# Patient Record
(demographics unavailable — no encounter records)

---

## 2025-06-10 NOTE — COUNSELING
[Nutrition/ Exercise/ Weight Management] : nutrition, exercise, weight management [Body Image] : body image [Vitamins/Supplements] : vitamins/supplements [Sunscreen] : sunscreen [Bladder Hygiene] : bladder hygiene [Breast Self Exam] : breast self exam

## 2025-06-10 NOTE — COUNSELING
[Body Image] : body image [Nutrition/ Exercise/ Weight Management] : nutrition, exercise, weight management [Vitamins/Supplements] : vitamins/supplements [Sunscreen] : sunscreen [Bladder Hygiene] : bladder hygiene [Breast Self Exam] : breast self exam

## 2025-06-12 NOTE — DISCUSSION/SUMMARY
[FreeTextEntry1] :  GYN Annual evaluation today -pap smear and vaginal cultures sent We discussed -- Acute vaginitis, AUB and Pelvic pain causes and treatment options. PLAN: Start: Terconazole 80 MG Vaginal Suppository; APPLY 1 APPLICATORFUL VAGINALLY EVERY NIGHT AT BEDTIME FOR 3 DAYS for Vaginitis issue, Ordered MRI Pelvis w/wo IV Contrast for AUB and Pelvic pain. Patient verbalized understanding of all explanations, and her questions were answered, and all concerns were addressed. Patient was screened for depression - no signs of clinical depression. PHQ-2 on file Rx given for pelvic sonogram RTO in    I, William robles acting as scribe for Dr. Mallory. 06/10/2025   The documentation recorded by the scribe, in my presence, accurately reflects the service I personally performed, and the decisions made by me with my edits as appropriate on 06/12/2025  Teetee Mallory MD, FACOG

## 2025-06-12 NOTE — HISTORY OF PRESENT ILLNESS
[Patient reported PAP Smear was normal] : Patient reported PAP Smear was normal [Y] : Patient is sexually active [N] : Patient denies prior pregnancies [Localized] : localized [[place]] : intercourse [No] : Patient does not have concerns regarding sex [Currently Active] : currently active [Men] : men [TextBox_4] : 26 yo female presents for annual exam. Chief c/o: right side pelvic pain with sex, Vaginitis, AUB. [PapSmeardate] : 3/28/23 [LMPDate] : 5/26/25 [Menses] : not menses [Urination] : not urination [Bowel Movement] : not bowel movement [TextBox_7] : right side  [FreeTextEntry1] : postcoital right sided pelvic pain

## 2025-06-12 NOTE — REASON FOR VISIT
[Annual] : an annual visit. [Pelvic Pain] : pelvic pain [Abnormal Uterine Bleeding] : abnormal uterine bleeding [Vulvar/Vaginal Complaint] : vulvar/vaginal complaint

## 2025-06-12 NOTE — HISTORY OF PRESENT ILLNESS
[Patient reported PAP Smear was normal] : Patient reported PAP Smear was normal [Y] : Patient is sexually active [N] : Patient denies prior pregnancies [Localized] : localized [[place]] : intercourse [No] : Patient does not have concerns regarding sex [Men] : men [Currently Active] : currently active [TextBox_4] : 26 yo female presents for annual exam. Chief c/o: right side pelvic pain with sex, Vaginitis, AUB. [PapSmeardate] : 3/28/23 [LMPDate] : 5/26/25 [Menses] : not menses [Urination] : not urination [Bowel Movement] : not bowel movement [TextBox_7] : right side  [FreeTextEntry1] : postcoital right sided pelvic pain

## 2025-06-12 NOTE — PHYSICAL EXAM
[Chaperoned Physical Exam] : A chaperone was present in the examining room during all aspects of the physical examination. [MA] : MA [Appropriately responsive] : appropriately responsive [Alert] : alert [No Acute Distress] : no acute distress [No Lymphadenopathy] : no lymphadenopathy [Soft] : soft [Non-tender] : non-tender [Non-distended] : non-distended [Oriented x3] : oriented x3 [Examination Of The Breasts] : a normal appearance [No Discharge] : no discharge [No Masses] : no breast masses were palpable [Labia Minora] : normal [Labia Majora] : normal [Normal] : normal [Uterine Adnexae] : normal  [Adnexa Tenderness On The Right] : tender  [FreeTextEntry2] : William [FreeTextEntry6] : No tenderness, No nipple discharge and no adenopathy.

## 2025-06-12 NOTE — HISTORY OF PRESENT ILLNESS
[Patient reported PAP Smear was normal] : Patient reported PAP Smear was normal [Y] : Patient is sexually active [N] : Patient denies prior pregnancies [Localized] : localized [[place]] : intercourse [No] : Patient does not have concerns regarding sex [Currently Active] : currently active [Men] : men [TextBox_4] : 26 yo female presents for annual exam. Chief c/o: right side pelvic pain with sex, Vaginitis, AUB. [PapSmeardate] : 3/28/23 [LMPDate] : 5/26/25 [Menses] : not menses [Urination] : not urination [Bowel Movement] : not bowel movement [TextBox_7] : right side  [FreeTextEntry1] : postcoital right sided pelvic pain

## 2025-06-12 NOTE — PHYSICAL EXAM
[Chaperoned Physical Exam] : A chaperone was present in the examining room during all aspects of the physical examination. [MA] : MA [Appropriately responsive] : appropriately responsive [Alert] : alert [No Acute Distress] : no acute distress [No Lymphadenopathy] : no lymphadenopathy [Soft] : soft [Non-tender] : non-tender [Non-distended] : non-distended [Oriented x3] : oriented x3 [Examination Of The Breasts] : a normal appearance [No Masses] : no breast masses were palpable [No Discharge] : no discharge [Labia Majora] : normal [Labia Minora] : normal [Normal] : normal [Uterine Adnexae] : normal  [Adnexa Tenderness On The Right] : tender  [FreeTextEntry2] : William [FreeTextEntry6] : No tenderness, No nipple discharge and no adenopathy.

## 2025-07-30 NOTE — DISCUSSION/SUMMARY
[FreeTextEntry1] : GYN evaluation today We discussed -- Pelvic pain, Abnormal uterine bleeding (AUB) and Endometriosis causes and treatment options. PLAN: We discussed MRI findings suggest possible endometriosis, primarily affecting the right ovary, which presents with multiple cysts and a hyperintense focus indicative of hemorrhage or endometriosis. There is associated pain, particularly on the right side. Diagnosis confirmation requires tissue confirmation via laparoscopy. Proceed with laparoscopy for tissue confirmation and cleaning of the ovaries. Offer treatment options to manage endometriosis post-procedure, including an IUD with progesterone (Mirena preferred for minimal systemic hormonal impact), oral contraceptive pills, or vaginal ring. Discuss risks and benefits of hormonal treatments as well as future fertility implications. Right ovary presents with multiple simple and complex thinly septated cysts, with no concerning nodules indicative of malignancy. Indeterminate T2 hyperintense focus warrants further investigation and tissue confirmation. Include investigation of the right ovary during laparoscopy. Confirm diagnosis (hemorrhage vs. endometriosis) through tissue biopsy and address lesions during procedure. Left ovary shows smaller cysts with subcentimeter intra-ovarian focus suggestive of smaller endometrial lesions. Evaluate and treat during laparoscopy, ensuring lesions are cleaned and the ovary is preserved for future fertility. Endometriosis-associated pain management and prevention of recurrent lesions require targeted hormonal therapy. Recommend Mirena IUD as first-line hormonal management due to its localized effect. Discuss alternative hormonal treatments such as oral contraceptive pills or vaginal ring, weighing side effects and patient preferences. Educate on the importance of consistent management to prevent recurrence and maintain fertility options. Patient will contact the office to confirm if she would like the IUD to be inserted at the same time as the procedure. Patient verbalized understanding of all explanations, and her questions were answered, and all concerns were addressed. Patient was screened for depression - no signs of clinical depression, PHQ-2 on file. RTO in 2 weeks prior to procedure for pre-op review   IWilliam acting as scribe for Dr. Mallory. 07/30/2025   The documentation recorded by the scribe, in my presence, accurately reflects the service I personally performed, and the decisions made by me with my edits as appropriate on 07/30/2025  Teetee Mallory MD, FACOG

## 2025-07-30 NOTE — HISTORY OF PRESENT ILLNESS
[Patient reported PAP Smear was normal] : Patient reported PAP Smear was normal [N] : Patient does not use contraception [FreeTextEntry1] : 26 YO F patient presents for GYN follow up check-up Chief c/o: MRI results  [TextBox_4] : 26 yo female presents for MRI results  [PapSmeardate] : 6/10/25 [HPVDate] : 6/10/25 [LMPDate] : 7/7/25

## 2025-07-30 NOTE — PHYSICAL EXAM
[MA] : MA [Appropriately responsive] : appropriately responsive [Alert] : alert [No Acute Distress] : no acute distress [Oriented x3] : oriented x3 [FreeTextEntry2] : William